# Patient Record
Sex: MALE | Race: WHITE | URBAN - NONMETROPOLITAN AREA
[De-identification: names, ages, dates, MRNs, and addresses within clinical notes are randomized per-mention and may not be internally consistent; named-entity substitution may affect disease eponyms.]

---

## 2021-08-19 ENCOUNTER — IMPORTED ENCOUNTER (OUTPATIENT)
Dept: URBAN - NONMETROPOLITAN AREA CLINIC 1 | Facility: CLINIC | Age: 26
End: 2021-08-19

## 2021-08-19 PROBLEM — H52.223: Noted: 2021-08-19

## 2021-08-19 PROBLEM — H52.13: Noted: 2021-08-19

## 2021-08-19 PROCEDURE — 92015 DETERMINE REFRACTIVE STATE: CPT

## 2021-08-19 PROCEDURE — 92004 COMPRE OPH EXAM NEW PT 1/>: CPT

## 2021-08-19 PROCEDURE — 76514 ECHO EXAM OF EYE THICKNESS: CPT

## 2021-08-19 NOTE — PATIENT DISCUSSION
Compound Myopic Astigmatism OU -  discussed findings w/patient-  new spectacle Rx issued-  paperwork filled out for possible  corrective laser procedure-  Pach done by NL    OD: 539    OS: 538-  continue to monitor yearly or prn; 's Notes:  8/19/2021DFE 8/19/2021Pach (NL) 8/19/2021

## 2022-04-09 ASSESSMENT — VISUAL ACUITY
OS_SC: 20/20
OD_SC: 20/20-

## 2022-04-09 ASSESSMENT — TONOMETRY
OD_IOP_MMHG: 15
OS_IOP_MMHG: 15

## 2022-09-28 ENCOUNTER — COMPREHENSIVE EXAM (OUTPATIENT)
Dept: URBAN - NONMETROPOLITAN AREA CLINIC 4 | Facility: CLINIC | Age: 27
End: 2022-09-28

## 2022-09-28 DIAGNOSIS — H52.13: ICD-10-CM

## 2022-09-28 DIAGNOSIS — H52.223: ICD-10-CM

## 2022-09-28 PROCEDURE — 92014 COMPRE OPH EXAM EST PT 1/>: CPT

## 2022-09-28 PROCEDURE — 92015 DETERMINE REFRACTIVE STATE: CPT

## 2022-09-28 ASSESSMENT — VISUAL ACUITY
OD_SC: 20/20-1
OU_SC: 20/20
OS_SC: 20/20

## 2022-09-28 ASSESSMENT — TONOMETRY
OS_IOP_MMHG: 15
OD_IOP_MMHG: 15

## 2022-09-28 NOTE — PATIENT DISCUSSION
Recommend LASIK OU. Risks and benefits discussed with the patient including infection, bleeding, loss of vision, flap complications, mechanical failure, and over/under correction. The entire procedure was explained to the patient from start to finish and all of the patient's questions were answered. Patient desires to proceed with surgery and understands realistic expectations.

## 2022-09-28 NOTE — PATIENT DISCUSSION
Compound Myopic Astigmatism OU -  discussed findings w/patient-  new spectacle Rx issued-  paperwork filled out for possible  corrective laser procedure-  Pach done by NL    OD: 539    OS: 538-  continue to monitor yearly or prn; 's Notes:  8/19/2021DFE 8/19/2021Pach (NL) 8/19/2021.